# Patient Record
Sex: MALE | NOT HISPANIC OR LATINO | Employment: FULL TIME | URBAN - METROPOLITAN AREA
[De-identification: names, ages, dates, MRNs, and addresses within clinical notes are randomized per-mention and may not be internally consistent; named-entity substitution may affect disease eponyms.]

---

## 2017-02-06 ENCOUNTER — HOSPITAL ENCOUNTER (OUTPATIENT)
Facility: MEDICAL CENTER | Age: 47
End: 2017-02-06
Attending: FAMILY MEDICINE
Payer: COMMERCIAL

## 2017-02-06 PROCEDURE — 88305 TISSUE EXAM BY PATHOLOGIST: CPT

## 2017-02-07 LAB — PATHOLOGY CONSULT NOTE: NORMAL

## 2017-02-22 ENCOUNTER — HOSPITAL ENCOUNTER (OUTPATIENT)
Dept: LAB | Facility: MEDICAL CENTER | Age: 47
End: 2017-02-22
Attending: FAMILY MEDICINE
Payer: COMMERCIAL

## 2017-02-22 LAB
25(OH)D3 SERPL-MCNC: 38 NG/ML (ref 30–100)
ALBUMIN SERPL BCP-MCNC: 4.5 G/DL (ref 3.2–4.9)
ALBUMIN/GLOB SERPL: 1.6 G/DL
ALP SERPL-CCNC: 90 U/L (ref 30–99)
ALT SERPL-CCNC: 22 U/L (ref 2–50)
ANION GAP SERPL CALC-SCNC: 8 MMOL/L (ref 0–11.9)
AST SERPL-CCNC: 31 U/L (ref 12–45)
BASOPHILS # BLD AUTO: 0.06 K/UL (ref 0–0.12)
BASOPHILS NFR BLD AUTO: 1.2 % (ref 0–1.8)
BILIRUB SERPL-MCNC: 1.1 MG/DL (ref 0.1–1.5)
BUN SERPL-MCNC: 20 MG/DL (ref 8–22)
CALCIUM SERPL-MCNC: 10.1 MG/DL (ref 8.5–10.5)
CHLORIDE SERPL-SCNC: 103 MMOL/L (ref 96–112)
CHOLEST SERPL-MCNC: 159 MG/DL (ref 100–199)
CO2 SERPL-SCNC: 29 MMOL/L (ref 20–33)
COMMENT 1642: NORMAL
CREAT SERPL-MCNC: 1.11 MG/DL (ref 0.5–1.4)
EOSINOPHIL # BLD: 0.04 K/UL (ref 0–0.51)
EOSINOPHIL NFR BLD AUTO: 0.8 % (ref 0–6.9)
ERYTHROCYTE [DISTWIDTH] IN BLOOD BY AUTOMATED COUNT: 41.1 FL (ref 35.9–50)
GLOBULIN SER CALC-MCNC: 2.9 G/DL (ref 1.9–3.5)
GLUCOSE SERPL-MCNC: 87 MG/DL (ref 65–99)
HCT VFR BLD AUTO: 44 % (ref 42–52)
HDLC SERPL-MCNC: 70 MG/DL
HGB BLD-MCNC: 15.2 G/DL (ref 14–18)
IMM GRANULOCYTES # BLD AUTO: 0.01 K/UL (ref 0–0.11)
IMM GRANULOCYTES NFR BLD AUTO: 0.2 % (ref 0–0.9)
LDLC SERPL CALC-MCNC: 81 MG/DL
LYMPHOCYTES # BLD: 1.75 K/UL (ref 1–4.8)
LYMPHOCYTES NFR BLD AUTO: 36.2 % (ref 22–41)
MCH RBC QN AUTO: 30.6 PG (ref 27–33)
MCHC RBC AUTO-ENTMCNC: 34.5 G/DL (ref 33.7–35.3)
MCV RBC AUTO: 88.7 FL (ref 81.4–97.8)
MONOCYTES # BLD: 0.32 K/UL (ref 0–0.85)
MONOCYTES NFR BLD AUTO: 6.6 % (ref 0–13.4)
MORPHOLOGY BLD-IMP: NORMAL
NEUTROPHILS # BLD: 2.65 K/UL (ref 1.82–7.42)
NEUTROPHILS NFR BLD AUTO: 55 % (ref 44–72)
NRBC # BLD AUTO: 0 K/UL
NRBC BLD-RTO: 0 /100 WBC
PLATELET # BLD AUTO: 245 K/UL (ref 164–446)
PMV BLD AUTO: 10.9 FL (ref 9–12.9)
POTASSIUM SERPL-SCNC: 5.2 MMOL/L (ref 3.6–5.5)
PROT SERPL-MCNC: 7.4 G/DL (ref 6–8.2)
RBC # BLD AUTO: 4.96 M/UL (ref 4.7–6.1)
SODIUM SERPL-SCNC: 140 MMOL/L (ref 135–145)
TRIGL SERPL-MCNC: 38 MG/DL (ref 0–149)
WBC # BLD AUTO: 4.8 K/UL (ref 4.8–10.8)

## 2017-02-22 PROCEDURE — 80061 LIPID PANEL: CPT

## 2017-02-22 PROCEDURE — 85025 COMPLETE CBC W/AUTO DIFF WBC: CPT

## 2017-02-22 PROCEDURE — 82306 VITAMIN D 25 HYDROXY: CPT

## 2017-02-22 PROCEDURE — 80053 COMPREHEN METABOLIC PANEL: CPT

## 2017-02-22 PROCEDURE — 36415 COLL VENOUS BLD VENIPUNCTURE: CPT

## 2017-10-23 DIAGNOSIS — Z20.2 POSSIBLE EXPOSURE TO STD: ICD-10-CM

## 2017-10-24 ENCOUNTER — HOSPITAL ENCOUNTER (OUTPATIENT)
Dept: LAB | Facility: MEDICAL CENTER | Age: 47
End: 2017-10-24
Attending: FAMILY MEDICINE
Payer: COMMERCIAL

## 2017-10-24 DIAGNOSIS — Z20.2 POSSIBLE EXPOSURE TO STD: ICD-10-CM

## 2017-10-24 LAB
HCV AB SER QL: NEGATIVE
HIV 1+2 AB+HIV1 P24 AG SERPL QL IA: NON REACTIVE
TREPONEMA PALLIDUM IGG+IGM AB [PRESENCE] IN SERUM OR PLASMA BY IMMUNOASSAY: NON REACTIVE

## 2017-10-24 PROCEDURE — 87389 HIV-1 AG W/HIV-1&-2 AB AG IA: CPT

## 2017-10-24 PROCEDURE — 87591 N.GONORRHOEAE DNA AMP PROB: CPT

## 2017-10-24 PROCEDURE — 86803 HEPATITIS C AB TEST: CPT

## 2017-10-24 PROCEDURE — 86780 TREPONEMA PALLIDUM: CPT

## 2017-10-24 PROCEDURE — 86695 HERPES SIMPLEX TYPE 1 TEST: CPT

## 2017-10-24 PROCEDURE — 36415 COLL VENOUS BLD VENIPUNCTURE: CPT

## 2017-10-24 PROCEDURE — 86696 HERPES SIMPLEX TYPE 2 TEST: CPT

## 2017-10-24 PROCEDURE — 87491 CHLMYD TRACH DNA AMP PROBE: CPT

## 2017-10-25 LAB
C TRACH DNA SPEC QL NAA+PROBE: NEGATIVE
N GONORRHOEA DNA SPEC QL NAA+PROBE: NEGATIVE
SPECIMEN SOURCE: NORMAL

## 2017-10-26 LAB
HSV1 GG IGG SER-ACNC: 0.03 IV
HSV2 GG IGG SER-ACNC: 0.05 IV

## 2018-11-13 ENCOUNTER — APPOINTMENT (OUTPATIENT)
Dept: URGENT CARE | Facility: PHYSICIAN GROUP | Age: 48
End: 2018-11-13
Payer: COMMERCIAL

## 2018-11-13 ENCOUNTER — APPOINTMENT (OUTPATIENT)
Dept: RADIOLOGY | Facility: IMAGING CENTER | Age: 48
End: 2018-11-13
Attending: CHIROPRACTOR
Payer: COMMERCIAL

## 2018-11-13 DIAGNOSIS — M25.572 PAIN OF JOINT OF LEFT ANKLE AND FOOT: ICD-10-CM

## 2018-11-13 DIAGNOSIS — M25.571 PAIN IN JOINT INVOLVING RIGHT ANKLE AND FOOT: ICD-10-CM

## 2018-11-13 PROCEDURE — 73610 X-RAY EXAM OF ANKLE: CPT | Mod: TC,FY,RT | Performed by: FAMILY MEDICINE

## 2019-03-12 ENCOUNTER — HOSPITAL ENCOUNTER (OUTPATIENT)
Dept: LAB | Facility: MEDICAL CENTER | Age: 49
End: 2019-03-12
Attending: FAMILY MEDICINE
Payer: COMMERCIAL

## 2019-03-12 LAB
ALBUMIN SERPL BCP-MCNC: 4.3 G/DL (ref 3.2–4.9)
ALBUMIN/GLOB SERPL: 1.4 G/DL
ALP SERPL-CCNC: 96 U/L (ref 30–99)
ALT SERPL-CCNC: 21 U/L (ref 2–50)
ANION GAP SERPL CALC-SCNC: 9 MMOL/L (ref 0–11.9)
AST SERPL-CCNC: 30 U/L (ref 12–45)
BASOPHILS # BLD AUTO: 1.4 % (ref 0–1.8)
BASOPHILS # BLD: 0.08 K/UL (ref 0–0.12)
BILIRUB SERPL-MCNC: 0.4 MG/DL (ref 0.1–1.5)
BUN SERPL-MCNC: 29 MG/DL (ref 8–22)
CALCIUM SERPL-MCNC: 9 MG/DL (ref 8.5–10.5)
CHLORIDE SERPL-SCNC: 107 MMOL/L (ref 96–112)
CHOLEST SERPL-MCNC: 143 MG/DL (ref 100–199)
CO2 SERPL-SCNC: 25 MMOL/L (ref 20–33)
CREAT SERPL-MCNC: 1.06 MG/DL (ref 0.5–1.4)
EOSINOPHIL # BLD AUTO: 0.11 K/UL (ref 0–0.51)
EOSINOPHIL NFR BLD: 2 % (ref 0–6.9)
ERYTHROCYTE [DISTWIDTH] IN BLOOD BY AUTOMATED COUNT: 41.9 FL (ref 35.9–50)
FASTING STATUS PATIENT QL REPORTED: NORMAL
GLOBULIN SER CALC-MCNC: 3 G/DL (ref 1.9–3.5)
GLUCOSE SERPL-MCNC: 83 MG/DL (ref 65–99)
HCT VFR BLD AUTO: 45.9 % (ref 42–52)
HDLC SERPL-MCNC: 59 MG/DL
HGB BLD-MCNC: 15.2 G/DL (ref 14–18)
IMM GRANULOCYTES # BLD AUTO: 0.02 K/UL (ref 0–0.11)
IMM GRANULOCYTES NFR BLD AUTO: 0.4 % (ref 0–0.9)
IRON SERPL-MCNC: 77 UG/DL (ref 50–180)
LDLC SERPL CALC-MCNC: 74 MG/DL
LYMPHOCYTES # BLD AUTO: 1.54 K/UL (ref 1–4.8)
LYMPHOCYTES NFR BLD: 27.3 % (ref 22–41)
MCH RBC QN AUTO: 29.9 PG (ref 27–33)
MCHC RBC AUTO-ENTMCNC: 33.1 G/DL (ref 33.7–35.3)
MCV RBC AUTO: 90.2 FL (ref 81.4–97.8)
MONOCYTES # BLD AUTO: 0.48 K/UL (ref 0–0.85)
MONOCYTES NFR BLD AUTO: 8.5 % (ref 0–13.4)
NEUTROPHILS # BLD AUTO: 3.41 K/UL (ref 1.82–7.42)
NEUTROPHILS NFR BLD: 60.4 % (ref 44–72)
NRBC # BLD AUTO: 0 K/UL
NRBC BLD-RTO: 0 /100 WBC
PLATELET # BLD AUTO: 259 K/UL (ref 164–446)
PMV BLD AUTO: 10.2 FL (ref 9–12.9)
POTASSIUM SERPL-SCNC: 4.3 MMOL/L (ref 3.6–5.5)
PROT SERPL-MCNC: 7.3 G/DL (ref 6–8.2)
RBC # BLD AUTO: 5.09 M/UL (ref 4.7–6.1)
SODIUM SERPL-SCNC: 141 MMOL/L (ref 135–145)
TRIGL SERPL-MCNC: 49 MG/DL (ref 0–149)
WBC # BLD AUTO: 5.6 K/UL (ref 4.8–10.8)

## 2019-03-12 PROCEDURE — 85025 COMPLETE CBC W/AUTO DIFF WBC: CPT

## 2019-03-12 PROCEDURE — 80053 COMPREHEN METABOLIC PANEL: CPT

## 2019-03-12 PROCEDURE — 80061 LIPID PANEL: CPT

## 2019-03-12 PROCEDURE — 83540 ASSAY OF IRON: CPT

## 2019-03-12 PROCEDURE — 36415 COLL VENOUS BLD VENIPUNCTURE: CPT

## 2019-11-11 ENCOUNTER — OFFICE VISIT (OUTPATIENT)
Dept: URGENT CARE | Facility: PHYSICIAN GROUP | Age: 49
End: 2019-11-11
Payer: COMMERCIAL

## 2019-11-11 VITALS
WEIGHT: 193 LBS | HEART RATE: 78 BPM | RESPIRATION RATE: 16 BRPM | TEMPERATURE: 98 F | BODY MASS INDEX: 23.5 KG/M2 | DIASTOLIC BLOOD PRESSURE: 60 MMHG | HEIGHT: 76 IN | SYSTOLIC BLOOD PRESSURE: 110 MMHG | OXYGEN SATURATION: 98 %

## 2019-11-11 DIAGNOSIS — R68.89 FLU-LIKE SYMPTOMS: ICD-10-CM

## 2019-11-11 PROCEDURE — 99203 OFFICE O/P NEW LOW 30 MIN: CPT | Performed by: PHYSICIAN ASSISTANT

## 2019-11-11 SDOH — HEALTH STABILITY: MENTAL HEALTH: HOW OFTEN DO YOU HAVE A DRINK CONTAINING ALCOHOL?: 2-3 TIMES A WEEK

## 2019-11-11 NOTE — PROGRESS NOTES
Chief Complaint   Patient presents with   • Fever     x 5 days, cough, headache, body aches       HISTORY OF PRESENT ILLNESS: Patient is a 48 y.o. male who presents today for the following:    Patient comes in for evaluation of a 5-day history of acute onset cough, fever, body aches.  Patient reports mild nasal congestion and extreme fatigue.  He was feeling a little bit better over the last couple days but woke up this morning feeling more fatigued and achy.  He denies any shortness of breath.  He has not measured his fever.  He has been taking ibuprofen which helps temporarily.    There are no active problems to display for this patient.      Allergies:Patient has no known allergies.    No current [facility]-ordered outpatient medications on file.     No current [facility]-ordered facility-administered medications on file.        History reviewed. No pertinent past medical history.    Social History     Tobacco Use   • Smoking status: Never Smoker   • Smokeless tobacco: Never Used   Substance Use Topics   • Alcohol use: Yes     Frequency: 2-3 times a week   • Drug use: Never       No family status information on file.   History reviewed. No pertinent family history.    Review of Systems:   Constitutional ROS: Positive for fatigue.  Eye ROS: No recent significant change in vision, No eye pain, redness, discharge  Ear ROS: No drainage, No tinnitus or vertigo, No recent change in hearing  Mouth/Throat ROS: No teeth or gum problems, No bleeding gums, No tongue complaints  Neck ROS: No swollen glands, No significant pain in neck  Pulmonary ROS: No chronic cough, sputum, or hemoptysis, No dyspnea on exertion, No wheezing  Cardiovascular ROS: No diaphoresis, No edema, No palpitations  Gastrointestinal ROS: No change in bowel habits, No significant change in appetite, No nausea, vomiting, diarrhea, or constipation  Musculoskeletal/Extremities ROS: No peripheral edema, No pain, redness or swelling on the joints  Hematologic/Lymphatic  "ROS: Positive for fever and body aches.  Skin/Integumentary ROS: No edema, No evidence of rash, No itching      Exam:  /60   Pulse 78   Temp 36.7 °C (98 °F) (Temporal)   Resp 16   Ht 1.93 m (6' 4\")   Wt 87.5 kg (193 lb)   SpO2 98%   General: Well developed, well nourished. No distress.  Eye: PERRL/EOMI; conjunctivae clear, lids normal.  ENMT: Lips without lesions, MMM. Oropharynx is clear. Bilateral TMs are within normal limits.  Pulmonary: Unlabored respiratory effort. Lungs clear to auscultation, no wheezes, no rhonchi.  Cardiovascular: Regular rate and rhythm without murmur.   Neurologic: Grossly nonfocal. No facial asymmetry noted.  Lymph: No cervical lymphadenopathy noted.  Skin: Warm, dry, good turgor. No rashes in visible areas.   Psych: Normal mood. Alert and oriented x3. Judgment and insight is normal.    Assessment/Plan:  Discussed with patient that his symptoms are consistent with influenza.  Given that he is 5 days out from symptom onset, Tamiflu is likely of little benefit therefore he was not tested. Low suspicion for pneumonia.. Discussed appropriate over-the-counter symptomatic medication, and when to return to clinic. Follow up for worsening or persistent symptoms.  1. Flu-like symptoms         "

## 2019-11-19 DIAGNOSIS — J06.9 UPPER RESPIRATORY TRACT INFECTION, UNSPECIFIED TYPE: ICD-10-CM

## 2019-11-19 RX ORDER — AZITHROMYCIN 250 MG/1
TABLET, FILM COATED ORAL
Qty: 6 TAB | Refills: 0 | Status: SHIPPED | OUTPATIENT
Start: 2019-11-19 | End: 2022-01-07

## 2019-11-19 NOTE — PROGRESS NOTES
Seen 11/11/19, flu like symptoms. Still very sick per email. Will try abx. Follow up if symptoms do not improve.      Email:  I’m still not well, I’ve been battling a fever on and off for over a week. I’m weak (absolutely no energy), congested and coughing a significant amount along with spitting up phlegm (yellow). Is there any way you can call in a prescription for antibiotics?  Thank you again for leaving your email, I appreciate it so much. I thought by now I’d be better.    Thank you,  Daniel Chang

## 2021-01-28 ENCOUNTER — HOSPITAL ENCOUNTER (OUTPATIENT)
Dept: LAB | Facility: MEDICAL CENTER | Age: 51
End: 2021-01-28
Attending: FAMILY MEDICINE
Payer: COMMERCIAL

## 2021-01-28 LAB
25(OH)D3 SERPL-MCNC: 34 NG/ML (ref 30–100)
ALBUMIN SERPL BCP-MCNC: 4.6 G/DL (ref 3.2–4.9)
ALBUMIN/GLOB SERPL: 1.7 G/DL
ALP SERPL-CCNC: 80 U/L (ref 30–99)
ALT SERPL-CCNC: 21 U/L (ref 2–50)
ANION GAP SERPL CALC-SCNC: 8 MMOL/L (ref 7–16)
AST SERPL-CCNC: 34 U/L (ref 12–45)
BASOPHILS # BLD AUTO: 1 % (ref 0–1.8)
BASOPHILS # BLD: 0.05 K/UL (ref 0–0.12)
BILIRUB SERPL-MCNC: 0.8 MG/DL (ref 0.1–1.5)
BUN SERPL-MCNC: 19 MG/DL (ref 8–22)
CALCIUM SERPL-MCNC: 9.6 MG/DL (ref 8.5–10.5)
CHLORIDE SERPL-SCNC: 104 MMOL/L (ref 96–112)
CHOLEST SERPL-MCNC: 187 MG/DL (ref 100–199)
CO2 SERPL-SCNC: 26 MMOL/L (ref 20–33)
CREAT SERPL-MCNC: 0.97 MG/DL (ref 0.5–1.4)
EOSINOPHIL # BLD AUTO: 0.05 K/UL (ref 0–0.51)
EOSINOPHIL NFR BLD: 1 % (ref 0–6.9)
ERYTHROCYTE [DISTWIDTH] IN BLOOD BY AUTOMATED COUNT: 42.7 FL (ref 35.9–50)
FASTING STATUS PATIENT QL REPORTED: NORMAL
GLOBULIN SER CALC-MCNC: 2.7 G/DL (ref 1.9–3.5)
GLUCOSE SERPL-MCNC: 89 MG/DL (ref 65–99)
HCT VFR BLD AUTO: 45 % (ref 42–52)
HDLC SERPL-MCNC: 84 MG/DL
HGB BLD-MCNC: 15.3 G/DL (ref 14–18)
IMM GRANULOCYTES # BLD AUTO: 0.01 K/UL (ref 0–0.11)
IMM GRANULOCYTES NFR BLD AUTO: 0.2 % (ref 0–0.9)
LDLC SERPL CALC-MCNC: 95 MG/DL
LYMPHOCYTES # BLD AUTO: 1.49 K/UL (ref 1–4.8)
LYMPHOCYTES NFR BLD: 29.3 % (ref 22–41)
MCH RBC QN AUTO: 30.6 PG (ref 27–33)
MCHC RBC AUTO-ENTMCNC: 34 G/DL (ref 33.7–35.3)
MCV RBC AUTO: 90 FL (ref 81.4–97.8)
MONOCYTES # BLD AUTO: 0.33 K/UL (ref 0–0.85)
MONOCYTES NFR BLD AUTO: 6.5 % (ref 0–13.4)
NEUTROPHILS # BLD AUTO: 3.15 K/UL (ref 1.82–7.42)
NEUTROPHILS NFR BLD: 62 % (ref 44–72)
NRBC # BLD AUTO: 0 K/UL
NRBC BLD-RTO: 0 /100 WBC
PLATELET # BLD AUTO: 214 K/UL (ref 164–446)
PMV BLD AUTO: 10.7 FL (ref 9–12.9)
POTASSIUM SERPL-SCNC: 4.4 MMOL/L (ref 3.6–5.5)
PROT SERPL-MCNC: 7.3 G/DL (ref 6–8.2)
PSA SERPL-MCNC: 1.13 NG/ML (ref 0–4)
RBC # BLD AUTO: 5 M/UL (ref 4.7–6.1)
SODIUM SERPL-SCNC: 138 MMOL/L (ref 135–145)
TRIGL SERPL-MCNC: 41 MG/DL (ref 0–149)
WBC # BLD AUTO: 5.1 K/UL (ref 4.8–10.8)

## 2021-01-28 PROCEDURE — 84153 ASSAY OF PSA TOTAL: CPT

## 2021-01-28 PROCEDURE — 80053 COMPREHEN METABOLIC PANEL: CPT

## 2021-01-28 PROCEDURE — 80061 LIPID PANEL: CPT

## 2021-01-28 PROCEDURE — 85025 COMPLETE CBC W/AUTO DIFF WBC: CPT

## 2021-01-28 PROCEDURE — 36415 COLL VENOUS BLD VENIPUNCTURE: CPT

## 2021-01-28 PROCEDURE — 82306 VITAMIN D 25 HYDROXY: CPT

## 2021-08-10 ENCOUNTER — APPOINTMENT (RX ONLY)
Dept: URBAN - NONMETROPOLITAN AREA CLINIC 15 | Facility: CLINIC | Age: 51
Setting detail: DERMATOLOGY
End: 2021-08-10

## 2021-08-10 DIAGNOSIS — Z71.89 OTHER SPECIFIED COUNSELING: ICD-10-CM

## 2021-08-10 DIAGNOSIS — D22 MELANOCYTIC NEVI: ICD-10-CM

## 2021-08-10 DIAGNOSIS — L82.1 OTHER SEBORRHEIC KERATOSIS: ICD-10-CM

## 2021-08-10 DIAGNOSIS — D18.0 HEMANGIOMA: ICD-10-CM

## 2021-08-10 DIAGNOSIS — L57.0 ACTINIC KERATOSIS: ICD-10-CM

## 2021-08-10 DIAGNOSIS — Z87.2 PERSONAL HISTORY OF DISEASES OF THE SKIN AND SUBCUTANEOUS TISSUE: ICD-10-CM

## 2021-08-10 DIAGNOSIS — L81.4 OTHER MELANIN HYPERPIGMENTATION: ICD-10-CM

## 2021-08-10 PROBLEM — D22.61 MELANOCYTIC NEVI OF RIGHT UPPER LIMB, INCLUDING SHOULDER: Status: ACTIVE | Noted: 2021-08-10

## 2021-08-10 PROBLEM — D18.01 HEMANGIOMA OF SKIN AND SUBCUTANEOUS TISSUE: Status: ACTIVE | Noted: 2021-08-10

## 2021-08-10 PROBLEM — D22.72 MELANOCYTIC NEVI OF LEFT LOWER LIMB, INCLUDING HIP: Status: ACTIVE | Noted: 2021-08-10

## 2021-08-10 PROBLEM — D22.71 MELANOCYTIC NEVI OF RIGHT LOWER LIMB, INCLUDING HIP: Status: ACTIVE | Noted: 2021-08-10

## 2021-08-10 PROBLEM — D22.62 MELANOCYTIC NEVI OF LEFT UPPER LIMB, INCLUDING SHOULDER: Status: ACTIVE | Noted: 2021-08-10

## 2021-08-10 PROBLEM — D22.5 MELANOCYTIC NEVI OF TRUNK: Status: ACTIVE | Noted: 2021-08-10

## 2021-08-10 PROCEDURE — ? LIQUID NITROGEN

## 2021-08-10 PROCEDURE — 99203 OFFICE O/P NEW LOW 30 MIN: CPT | Mod: 25

## 2021-08-10 PROCEDURE — 17003 DESTRUCT PREMALG LES 2-14: CPT

## 2021-08-10 PROCEDURE — ? OBSERVATION

## 2021-08-10 PROCEDURE — 17000 DESTRUCT PREMALG LESION: CPT

## 2021-08-10 PROCEDURE — ? COUNSELING

## 2021-08-10 ASSESSMENT — LOCATION DETAILED DESCRIPTION DERM
LOCATION DETAILED: LEFT MEDIAL SUPERIOR CHEST
LOCATION DETAILED: LEFT LATERAL EYEBROW
LOCATION DETAILED: LEFT VENTRAL PROXIMAL FOREARM
LOCATION DETAILED: RIGHT ANTERIOR DISTAL THIGH
LOCATION DETAILED: LEFT SUPERIOR OCCIPITAL SCALP
LOCATION DETAILED: LEFT POSTERIOR SHOULDER
LOCATION DETAILED: RIGHT SUPERIOR OCCIPITAL SCALP
LOCATION DETAILED: RIGHT MEDIAL INFERIOR CHEST
LOCATION DETAILED: RIGHT DISTAL POSTERIOR UPPER ARM
LOCATION DETAILED: RIGHT PROXIMAL CALF
LOCATION DETAILED: RIGHT POSTERIOR SHOULDER
LOCATION DETAILED: RIGHT LATERAL SUPERIOR CHEST
LOCATION DETAILED: LEFT PROXIMAL POSTERIOR UPPER ARM
LOCATION DETAILED: LEFT DISTAL POSTERIOR THIGH
LOCATION DETAILED: RIGHT PROXIMAL DORSAL FOREARM
LOCATION DETAILED: LEFT SUPERIOR LATERAL UPPER BACK
LOCATION DETAILED: RIGHT VENTRAL PROXIMAL FOREARM
LOCATION DETAILED: LEFT PROXIMAL CALF
LOCATION DETAILED: RIGHT SUPERIOR PARIETAL SCALP
LOCATION DETAILED: LEFT ANTERIOR DISTAL THIGH
LOCATION DETAILED: LEFT LATERAL SUPERIOR CHEST
LOCATION DETAILED: RIGHT DISTAL POSTERIOR THIGH
LOCATION DETAILED: LEFT SUPERIOR UPPER BACK
LOCATION DETAILED: LEFT PROXIMAL DORSAL FOREARM
LOCATION DETAILED: RIGHT INFERIOR LATERAL MALAR CHEEK
LOCATION DETAILED: LEFT SUPERIOR PARIETAL SCALP
LOCATION DETAILED: RIGHT INFERIOR MEDIAL UPPER BACK

## 2021-08-10 ASSESSMENT — LOCATION SIMPLE DESCRIPTION DERM
LOCATION SIMPLE: RIGHT CHEEK
LOCATION SIMPLE: LEFT CALF
LOCATION SIMPLE: RIGHT UPPER BACK
LOCATION SIMPLE: RIGHT THIGH
LOCATION SIMPLE: LEFT SHOULDER
LOCATION SIMPLE: SCALP
LOCATION SIMPLE: LEFT UPPER ARM
LOCATION SIMPLE: RIGHT SHOULDER
LOCATION SIMPLE: LEFT UPPER BACK
LOCATION SIMPLE: LEFT OCCIPITAL SCALP
LOCATION SIMPLE: LEFT POSTERIOR THIGH
LOCATION SIMPLE: RIGHT FOREARM
LOCATION SIMPLE: LEFT EYEBROW
LOCATION SIMPLE: RIGHT CALF
LOCATION SIMPLE: LEFT THIGH
LOCATION SIMPLE: RIGHT POSTERIOR THIGH
LOCATION SIMPLE: CHEST
LOCATION SIMPLE: RIGHT UPPER ARM
LOCATION SIMPLE: LEFT FOREARM
LOCATION SIMPLE: RIGHT OCCIPITAL SCALP

## 2021-08-10 ASSESSMENT — LOCATION ZONE DERM
LOCATION ZONE: SCALP
LOCATION ZONE: TRUNK
LOCATION ZONE: FACE
LOCATION ZONE: LEG
LOCATION ZONE: ARM

## 2021-08-10 NOTE — PROCEDURE: LIQUID NITROGEN
Post-Care Instructions: I reviewed with the patient in detail post-care instructions. Patient is to wear sunprotection, and avoid picking at any of the treated lesions. Pt may apply Vaseline to crusted or scabbing areas.
Render Post-Care Instructions In Note?: no
Consent: The patient's consent was obtained including but not limited to risks of crusting, scabbing, blistering, scarring, darker or lighter pigmentary change, recurrence, incomplete removal and infection.
Show Aperture Variable?: Yes
Duration Of Freeze Thaw-Cycle (Seconds): 0
Detail Level: Detailed

## 2022-01-07 ENCOUNTER — OFFICE VISIT (OUTPATIENT)
Dept: URGENT CARE | Facility: PHYSICIAN GROUP | Age: 52
End: 2022-01-07
Payer: COMMERCIAL

## 2022-01-07 VITALS
SYSTOLIC BLOOD PRESSURE: 122 MMHG | WEIGHT: 204 LBS | HEIGHT: 75 IN | BODY MASS INDEX: 25.36 KG/M2 | HEART RATE: 63 BPM | OXYGEN SATURATION: 100 % | DIASTOLIC BLOOD PRESSURE: 68 MMHG | TEMPERATURE: 97.1 F | RESPIRATION RATE: 12 BRPM

## 2022-01-07 DIAGNOSIS — A09 TRAVELER'S DIARRHEA: ICD-10-CM

## 2022-01-07 PROCEDURE — 99213 OFFICE O/P EST LOW 20 MIN: CPT | Performed by: STUDENT IN AN ORGANIZED HEALTH CARE EDUCATION/TRAINING PROGRAM

## 2022-01-07 RX ORDER — AZITHROMYCIN 500 MG/1
1000 TABLET, FILM COATED ORAL ONCE
Qty: 2 TABLET | Refills: 0 | Status: SHIPPED | OUTPATIENT
Start: 2022-01-07 | End: 2022-01-07

## 2022-01-07 ASSESSMENT — FIBROSIS 4 INDEX: FIB4 SCORE: 1.84

## 2022-01-07 NOTE — PROGRESS NOTES
"Subjective:   CHIEF COMPLAINT  Chief Complaint   Patient presents with   • Diarrhea     was in [place], symptoms x8 days       HPI  Daniel Chang is a 51 y.o. male who presents with a chief complaint of crampy abdominal discomfort associated with diarrhea which developed on 12/29/2021.  Patient went to [facility] on 12/25 and symptoms developed approximately 4 days later.  Since that time the patient reports has had multiple loose stools throughout the day.  He has not had any formed bowel movements.  There has been no blood.  No overt abdominal pain but more of a crampy discomfort.  He has not been experiencing any fevers, chills, body aches, nausea or vomiting.  Patient has had a colonoscopy in the past which was unremarkable.    REVIEW OF SYSTEMS  General: no fever or chills  GI: no nausea or vomiting  See HPI for further details.    PAST MEDICAL HISTORY  There are no problems to display for this patient.      SURGICAL HISTORY  patient denies any surgical history    ALLERGIES  No Known Allergies    CURRENT MEDICATIONS  Home Medications     Reviewed by Jude Herrera D.O. (Physician) on 01/07/22 at 1235  Med List Status: <None>   Medication Last Dose Status   azithromycin (ZITHROMAX) 250 MG Tab  Active                SOCIAL HISTORY  Social History     Tobacco Use   • Smoking status: Never Smoker   • Smokeless tobacco: Never Used   Substance and Sexual Activity   • Alcohol use: Yes   • Drug use: Never   • Sexual activity: Not on file       FAMILY HISTORY  No family history on file.       Objective:   PHYSICAL EXAM  VITAL SIGNS: /68   Pulse 63   Temp 36.2 °C (97.1 °F)   Resp 12   Ht 1.905 m (6' 3\")   Wt 92.5 kg (204 lb)   SpO2 100%   BMI 25.50 kg/m²     Gen: no acute distress, normal voice  Skin: dry, intact, moist mucosal membranes  Lungs: CTAB w/ symmetric expansion  CV: RRR w/o murmurs or clicks  Abdomen: Bowel sounds normal, soft, no tenderness, rebound or guarding.   Psych: normal affect, " normal judgement, alert, awake    Assessment/Plan:     1. Traveler's diarrhea  azithromycin (ZITHROMAX) 500 MG tablet   No red flags.  Exam is reassuring.  Sent Rx for azithromycin.  Follow-up in urgent care as needed if symptoms worsen or do not improve.    Differential diagnosis, natural history, supportive care, and indications for immediate follow-up discussed. All questions answered. Patient agrees with the plan of care.    Follow-up as needed if symptoms worsen or fail to improve to PCP, Urgent care or Emergency Room.    Please note that this dictation was created using voice recognition software. I have made a reasonable attempt to correct obvious errors, but I expect that there are errors of grammar and possibly content that I did not discover before finalizing the note.

## 2023-11-07 ENCOUNTER — OFFICE VISIT (OUTPATIENT)
Dept: URGENT CARE | Facility: PHYSICIAN GROUP | Age: 53
End: 2023-11-07
Payer: COMMERCIAL

## 2023-11-07 VITALS
HEIGHT: 77 IN | RESPIRATION RATE: 16 BRPM | TEMPERATURE: 98.1 F | WEIGHT: 197.6 LBS | SYSTOLIC BLOOD PRESSURE: 100 MMHG | BODY MASS INDEX: 23.33 KG/M2 | DIASTOLIC BLOOD PRESSURE: 72 MMHG | OXYGEN SATURATION: 99 % | HEART RATE: 55 BPM

## 2023-11-07 DIAGNOSIS — R21 RASH: Primary | ICD-10-CM

## 2023-11-07 PROCEDURE — 3074F SYST BP LT 130 MM HG: CPT | Performed by: PHYSICIAN ASSISTANT

## 2023-11-07 PROCEDURE — 3078F DIAST BP <80 MM HG: CPT | Performed by: PHYSICIAN ASSISTANT

## 2023-11-07 PROCEDURE — 99213 OFFICE O/P EST LOW 20 MIN: CPT | Performed by: PHYSICIAN ASSISTANT

## 2023-11-07 RX ORDER — PERMETHRIN 50 MG/G
CREAM TOPICAL
Qty: 60 G | Refills: 0 | Status: SHIPPED | OUTPATIENT
Start: 2023-11-07

## 2023-11-07 RX ORDER — TRIAMCINOLONE ACETONIDE 1 MG/G
CREAM TOPICAL
Qty: 45 G | Refills: 0 | Status: SHIPPED | OUTPATIENT
Start: 2023-11-07

## 2023-11-07 NOTE — PROGRESS NOTES
"Subjective:   Daniel Chang is a 52 y.o. male who presents for Rash (Bilateral leg with rash from short line to ankle. X 1 week and very itchy )      HPI  The patient presents to the Urgent Care with complaints of a rash onset about a week ago.  Was recently traveling.  He was running in the forest in [place]. Noticed rash the next day after staying in a hotel. Rash started to his ankles.  Rash is worse on the right leg but he has the rash on the left leg as well.  Somewhat itchy but not severe itching.  Some of the areas of the rash appeared a couple days later from initial onset.  Some areas on his lower legs, ankle, and thighs.  No rash to hands or wrists.  Some oozing clear drainage occasionally.  No fevers or chills.        No past medical history on file.  No Known Allergies     Objective:     /72   Pulse (!) 55   Temp 36.7 °C (98.1 °F) (Temporal)   Resp 16   Ht 1.956 m (6' 5\")   Wt 89.6 kg (197 lb 9.6 oz)   SpO2 99%   BMI 23.43 kg/m²     Physical Exam  Vitals reviewed.   Constitutional:       General: He is not in acute distress.     Appearance: Normal appearance. He is not ill-appearing or toxic-appearing.   Eyes:      Conjunctiva/sclera: Conjunctivae normal.   Cardiovascular:      Rate and Rhythm: Normal rate.   Pulmonary:      Effort: Pulmonary effort is normal.   Musculoskeletal:      Cervical back: Neck supple.   Skin:     General: Skin is warm and dry.      Findings: Rash present.      Comments: Few scattered erythematous papules and vesicles with trace erythema to bilateral ankles, lower legs, and thighs. Some lesions are in a linear pattern not larger than 3 cm long. No induration. No proximal streaking. No abscess.    Neurological:      General: No focal deficit present.      Mental Status: He is alert and oriented to person, place, and time.   Psychiatric:         Mood and Affect: Mood normal.         Behavior: Behavior normal.         Diagnosis and associated orders:     1. Rash  - " permethrin (ELIMITE) 5 % Cream; Apply half the tube to all areas of the body from the neck to soles of feet; leave on for 8 to 14 hours before removing by washing in shower. May repeat after 2 weeks if still have symptoms.  Dispense: 60 g; Refill: 0  - triamcinolone acetonide (KENALOG) 0.1 % Cream; Apply to affected area(s) twice daily for no longer than 14 days  Dispense: 45 g; Refill: 0       Comments/MDM:     Patient's presenting symptoms and exam findings consistent most likely with either poison ivy allergic contact dermatitis versus scabies.  Lower suspicion for scabies.  Running in forest. Stayed in hotel. However, we will treat with permethrin and triamcinolone cream.  Cool compresses.  Try to avoid itching.  May try Zyrtec.  If no improvement in a week or 2 or any worsening, return to the urgent care for evaluation. Wash clothes and all linens in hot water.          I personally reviewed prior external notes and test results pertinent to today's visit. Pathogenesis of diagnosis discussed including typical length and natural progression. Supportive care, natural history, differential diagnoses, and indications for immediate follow-up discussed. Patient expresses understanding and agrees to plan. Patient denies any other questions or concerns.     Follow-up with the primary care physician for recheck, reevaluation, and consideration of further management.    Please note that this dictation was created using voice recognition software. I have made a reasonable attempt to correct obvious errors, but I expect that there are errors of grammar and possibly content that I did not discover before finalizing the note.    This note was electronically signed by Jericho Campos PA-C

## 2023-11-11 ENCOUNTER — HOSPITAL ENCOUNTER (OUTPATIENT)
Facility: MEDICAL CENTER | Age: 53
End: 2023-11-11
Attending: NURSE PRACTITIONER
Payer: COMMERCIAL

## 2023-11-11 ENCOUNTER — OFFICE VISIT (OUTPATIENT)
Dept: URGENT CARE | Facility: PHYSICIAN GROUP | Age: 53
End: 2023-11-11
Payer: COMMERCIAL

## 2023-11-11 VITALS
HEART RATE: 60 BPM | BODY MASS INDEX: 23.5 KG/M2 | DIASTOLIC BLOOD PRESSURE: 68 MMHG | SYSTOLIC BLOOD PRESSURE: 104 MMHG | WEIGHT: 199 LBS | OXYGEN SATURATION: 99 % | RESPIRATION RATE: 16 BRPM | TEMPERATURE: 97.9 F | HEIGHT: 77 IN

## 2023-11-11 DIAGNOSIS — L25.5 DERMATITIS DUE TO PLANTS, INCLUDING POISON IVY, SUMAC, AND OAK: ICD-10-CM

## 2023-11-11 PROCEDURE — 99213 OFFICE O/P EST LOW 20 MIN: CPT | Performed by: NURSE PRACTITIONER

## 2023-11-11 PROCEDURE — 87205 SMEAR GRAM STAIN: CPT

## 2023-11-11 PROCEDURE — 3074F SYST BP LT 130 MM HG: CPT | Performed by: NURSE PRACTITIONER

## 2023-11-11 PROCEDURE — 3078F DIAST BP <80 MM HG: CPT | Performed by: NURSE PRACTITIONER

## 2023-11-11 PROCEDURE — 87070 CULTURE OTHR SPECIMN AEROBIC: CPT

## 2023-11-11 RX ORDER — PREDNISONE 10 MG/1
TABLET ORAL
Qty: 30 TABLET | Refills: 0 | Status: SHIPPED | OUTPATIENT
Start: 2023-11-11 | End: 2023-11-20

## 2023-11-11 ASSESSMENT — ENCOUNTER SYMPTOMS
CARDIOVASCULAR NEGATIVE: 1
EYES NEGATIVE: 1
GASTROINTESTINAL NEGATIVE: 1
RESPIRATORY NEGATIVE: 1
FEVER: 0
CONSTITUTIONAL NEGATIVE: 1

## 2023-11-11 NOTE — PROGRESS NOTES
"Subjective:   Daniel Chang is a 52 y.o. male who presents for Rash (X over a week on bilateral legs and L arm.  He was seen on 11-07-23 and given steroid cream but it is not helping. )      Patient presents for an evaluation of a worsening rash to his lower extremities, now progressing to his upper extremities.  He was seen on 11/7/2023 and was placed on steroid cream as well as Elimite cream, for a diagnosis of possible poison oak reaction versus scabies.  Patient did run a race which was in the hills and trees the Saturday prior to him being seen on 11/4/2023.  He did get hung up in some bushes, and also had a history of staying the night in a hotel.  Due to the vesicular eruptions, erythema, and some linear patterns, he was treated for possible scabies versus poison oak.  Patient has been using the Elimite cream as well as the steroid cream, but has noted no improvement in his symptoms.  He is actually worsening, with more eruptions to the tops of his thighs and now beginning on his left arm.  He does present with his wife today who is requesting a sample be sent to the lab for further evaluation of what the actual rash is.  Patient denies any fever or chills associated with this.  He does endorse extreme itching, which is keeping him up at night.  He has washed all of his clothing and bedding as instructed from his prior appointment.        Review of Systems   Constitutional: Negative.  Negative for fever.   HENT: Negative.     Eyes: Negative.    Respiratory: Negative.     Cardiovascular: Negative.    Gastrointestinal: Negative.    Skin:  Positive for itching and rash.        Bilateral legs, worsening, now to his left upper arm       Medications, Allergies, and current problem list reviewed today in Epic.     Objective:     /68   Pulse 60   Temp 36.6 °C (97.9 °F) (Temporal)   Resp 16   Ht 1.956 m (6' 5\")   Wt 90.3 kg (199 lb)   SpO2 99%     Physical Exam  Vitals reviewed.   Constitutional:       " General: He is not in acute distress.     Appearance: Normal appearance. He is not ill-appearing.   HENT:      Head: Normocephalic and atraumatic.      Nose: Nose normal.      Mouth/Throat:      Mouth: Mucous membranes are moist.      Pharynx: Oropharynx is clear.   Eyes:      Extraocular Movements: Extraocular movements intact.      Conjunctiva/sclera: Conjunctivae normal.      Pupils: Pupils are equal, round, and reactive to light.   Cardiovascular:      Rate and Rhythm: Normal rate and regular rhythm.      Pulses: Normal pulses.      Heart sounds: Normal heart sounds.   Pulmonary:      Effort: Pulmonary effort is normal.      Breath sounds: Normal breath sounds.   Abdominal:      General: Abdomen is flat. Bowel sounds are normal.      Palpations: Abdomen is soft.   Musculoskeletal:         General: Normal range of motion.      Cervical back: Normal range of motion and neck supple.   Skin:     General: Skin is warm and dry.      Capillary Refill: Capillary refill takes less than 2 seconds.      Findings: Erythema and rash present. Rash is macular, papular, scaling and vesicular.      Comments: Patient has diffuse erythematous, vesicular, macular papular rash to his bilateral lower extremities with some smaller eruptions to the inside of his left arm.  Some of these eruptions are in a linear pattern, but most are in an irregular pattern.  Evidence of excoriation is noted.  No secondary infection noted.  No weeping or drainage.  Sample was taken and sent to lab.   Neurological:      General: No focal deficit present.      Mental Status: He is alert and oriented to person, place, and time.   Psychiatric:         Mood and Affect: Mood normal.         Behavior: Behavior normal.         Assessment/Plan:     Diagnosis and associated orders:     1. Dermatitis due to plants, including poison ivy, sumac, and oak  predniSONE (DELTASONE) 10 MG Tab    CULTURE WOUND W/ GRAM STAIN         Comments/MDM:     Discussed with patient  and his wife that I am not completely certain which type of a skin eruption this is, though it does appear consistent with plant-based eruption such as poison ivy or oak.  I am less suspicious for scabies at this time.  We did send a sample to the lab for further evaluation today.  Patient was placed on a steroid taper, to help improve the eruption.  Patient will also add topical calamine lotion, continue the steroid topical, as well as use Benadryl at nighttime, and Claritin during the day.  Patient will follow-up with his PCP if this does not improve in 3 to 4 days.  He was advised that these types of outbreaks can last a couple of weeks.  Patient will be advised of his lab results when available, and any further treatment adjustments will be made at that time based on those results.         Differential diagnosis, natural history, supportive care, and indications for immediate follow-up discussed.    Advised the patient to follow-up with the primary care physician for recheck, reevaluation, and consideration of further management.    Please note that this dictation was created using voice recognition software. I have made a reasonable attempt to correct obvious errors, but I expect that there are errors of grammar and possibly content that I did not discover before finalizing the note.    This note was electronically signed by MILVIA Verma

## 2023-11-12 LAB
GRAM STN SPEC: NORMAL
SIGNIFICANT IND 70042: NORMAL
SITE SITE: NORMAL
SOURCE SOURCE: NORMAL

## 2023-11-14 LAB
BACTERIA WND AEROBE CULT: NORMAL
GRAM STN SPEC: NORMAL
SIGNIFICANT IND 70042: NORMAL
SITE SITE: NORMAL
SOURCE SOURCE: NORMAL

## 2023-11-15 NOTE — RESULT ENCOUNTER NOTE
Left message for patient that the results of his culture showed no organisms and norm al skin litzy.

## 2024-06-04 ENCOUNTER — OFFICE VISIT (OUTPATIENT)
Dept: URGENT CARE | Facility: PHYSICIAN GROUP | Age: 54
End: 2024-06-04
Payer: COMMERCIAL

## 2024-06-04 VITALS
BODY MASS INDEX: 22.91 KG/M2 | OXYGEN SATURATION: 97 % | SYSTOLIC BLOOD PRESSURE: 108 MMHG | WEIGHT: 194 LBS | RESPIRATION RATE: 18 BRPM | HEIGHT: 77 IN | HEART RATE: 85 BPM | DIASTOLIC BLOOD PRESSURE: 64 MMHG | TEMPERATURE: 97.4 F

## 2024-06-04 DIAGNOSIS — M70.21 OLECRANON BURSITIS, RIGHT ELBOW: ICD-10-CM

## 2024-06-04 PROCEDURE — 99213 OFFICE O/P EST LOW 20 MIN: CPT | Performed by: STUDENT IN AN ORGANIZED HEALTH CARE EDUCATION/TRAINING PROGRAM

## 2024-06-04 PROCEDURE — 3074F SYST BP LT 130 MM HG: CPT | Performed by: STUDENT IN AN ORGANIZED HEALTH CARE EDUCATION/TRAINING PROGRAM

## 2024-06-04 PROCEDURE — 3078F DIAST BP <80 MM HG: CPT | Performed by: STUDENT IN AN ORGANIZED HEALTH CARE EDUCATION/TRAINING PROGRAM

## 2024-06-04 RX ORDER — NAPROXEN 500 MG/1
500 TABLET ORAL 2 TIMES DAILY WITH MEALS
Qty: 14 TABLET | Refills: 0 | Status: SHIPPED | OUTPATIENT
Start: 2024-06-04

## 2024-06-05 ASSESSMENT — ENCOUNTER SYMPTOMS
FEVER: 0
CHILLS: 0

## 2024-06-18 ENCOUNTER — OFFICE VISIT (OUTPATIENT)
Dept: SPORTS MEDICINE | Facility: OTHER | Age: 54
End: 2024-06-18
Attending: STUDENT IN AN ORGANIZED HEALTH CARE EDUCATION/TRAINING PROGRAM
Payer: COMMERCIAL

## 2024-06-18 VITALS
TEMPERATURE: 98 F | HEART RATE: 64 BPM | BODY MASS INDEX: 22.91 KG/M2 | DIASTOLIC BLOOD PRESSURE: 72 MMHG | SYSTOLIC BLOOD PRESSURE: 110 MMHG | OXYGEN SATURATION: 99 % | WEIGHT: 194 LBS | HEIGHT: 77 IN

## 2024-06-18 DIAGNOSIS — M70.21 OLECRANON BURSITIS, RIGHT ELBOW: ICD-10-CM

## 2024-06-18 PROCEDURE — 3074F SYST BP LT 130 MM HG: CPT | Performed by: FAMILY MEDICINE

## 2024-06-18 PROCEDURE — 99214 OFFICE O/P EST MOD 30 MIN: CPT | Performed by: FAMILY MEDICINE

## 2024-06-18 PROCEDURE — 3078F DIAST BP <80 MM HG: CPT | Performed by: FAMILY MEDICINE

## 2024-06-18 ASSESSMENT — ENCOUNTER SYMPTOMS
NAUSEA: 0
CHILLS: 0
FEVER: 0
SHORTNESS OF BREATH: 0
DIZZINESS: 0
VOMITING: 0

## 2024-06-18 NOTE — Clinical Note
Solis Ledezma, Thank you for referring Daniel to the sports medicine clinic for his elbow swelling. Fortunately, the compression and conservative management has HELPED.  His swelling has come down to a manageable level.  He is going to see how he does over the next few weeks and we will schedule if he would like to proceed with aspiration/corticosteroid injection of the bursa. Hope you are well! Respectfully,  RAUL Goodrich M.D. [facility] [place] [phone]

## 2024-06-18 NOTE — PROGRESS NOTES
Chief Complaint   Patient presents with    Elbow Pain     Right elbow bursitis       Subjective     Referred by Brisa Heredia P.A.-C. for evaluation of RIGHT elbow swelling (L handed)  Insidious onset without specific incident that he can recall  Approximately 2 or more months ago (around early April 2024)  Swelling of the RIGHT olecranon bursa  Seen in urgent care and provided with an Ace wrap  Fortunately, swelling has come down some, but some of the swelling still persists  Not really experiencing much pain  Some discomfort if he weightbears on the RIGHT elbow  Not taking any medication for pain  No night symptoms  No prior injuries or issues with the RIGHT elbow      Running, cycling, weightlifting, swelling    Review of Systems   Constitutional:  Negative for chills and fever.   Respiratory:  Negative for shortness of breath.    Cardiovascular:  Negative for chest pain.   Gastrointestinal:  Negative for nausea and vomiting.   Neurological:  Negative for dizziness.     PMH:  has no past medical history on file.  MEDS:   Current Outpatient Medications:     naproxen (NAPROSYN) 500 MG Tab, Take 1 Tablet by mouth 2 times a day with meals. (Patient not taking: Reported on 6/18/2024), Disp: 14 Tablet, Rfl: 0    permethrin (ELIMITE) 5 % Cream, Apply half the tube to all areas of the body from the neck to soles of feet; leave on for 8 to 14 hours before removing by washing in shower. May repeat after 2 weeks if still have symptoms. (Patient not taking: Reported on 6/4/2024), Disp: 60 g, Rfl: 0    triamcinolone acetonide (KENALOG) 0.1 % Cream, Apply to affected area(s) twice daily for no longer than 14 days (Patient not taking: Reported on 6/4/2024), Disp: 45 g, Rfl: 0  ALLERGIES: No Known Allergies  SURGHX: No past surgical history on file.  SOCHX:  reports that he has never smoked. He has never used smokeless tobacco. He reports current alcohol use. He reports that he does not use drugs.  FH:  "Family history was reviewed, no pertinent findings to report    Objective   /72 (BP Location: Left arm, Patient Position: Sitting)   Pulse 64   Temp 36.7 °C (98 °F) (Temporal)   Ht 1.956 m (6' 5\")   Wt 88 kg (194 lb)   SpO2 99%   BMI 23.01 kg/m²     No acute distress  Alert and oriented ×3    BILATERAL Shoulder exam:  Range of motion INTACT  Strength testing NORMAL with empty can, internal rotation, external rotation and lift off testing  NO tenderness of the supraspinatus, infraspinatus or biceps tendon  Apprehension testing NORMAL    BILATERAL ELBOW exam  Range of motion intact  Mild swelling of the RIGHT olecranon bursa without tenderness  No tenderness of the medial or lateral epicondyle  Resisted finger extension test is NEGATIVE    1. Olecranon bursitis, right elbow          Insidious onset without specific incident that he can recall  Approximately 2 or more months ago (around early April 2024)  Swelling of the RIGHT olecranon bursa    Fortunately, bursitis has come down some  He still has some residual swelling    He will think about whether or not he wants to proceed with aspiration/corticosteroid injection of the olecranon bursa under ultrasound guidance    He can schedule if he would like to proceed with RIGHT elbow olecranon bursa corticosteroid injection under ultrasound guidance      Thank you Brsia Heredia P.A.-C.  For allowing me to participate in care of your patient.  "

## 2024-10-01 ENCOUNTER — APPOINTMENT (RX ONLY)
Dept: URBAN - NONMETROPOLITAN AREA CLINIC 15 | Facility: CLINIC | Age: 54
Setting detail: DERMATOLOGY
End: 2024-10-01

## 2024-10-01 DIAGNOSIS — Z71.89 OTHER SPECIFIED COUNSELING: ICD-10-CM

## 2024-10-01 DIAGNOSIS — D18.0 HEMANGIOMA: ICD-10-CM

## 2024-10-01 DIAGNOSIS — D22 MELANOCYTIC NEVI: ICD-10-CM

## 2024-10-01 DIAGNOSIS — L57.0 ACTINIC KERATOSIS: ICD-10-CM

## 2024-10-01 DIAGNOSIS — L82.1 OTHER SEBORRHEIC KERATOSIS: ICD-10-CM

## 2024-10-01 DIAGNOSIS — L81.4 OTHER MELANIN HYPERPIGMENTATION: ICD-10-CM

## 2024-10-01 PROBLEM — D22.5 MELANOCYTIC NEVI OF TRUNK: Status: ACTIVE | Noted: 2024-10-01

## 2024-10-01 PROBLEM — D22.61 MELANOCYTIC NEVI OF RIGHT UPPER LIMB, INCLUDING SHOULDER: Status: ACTIVE | Noted: 2024-10-01

## 2024-10-01 PROBLEM — D22.71 MELANOCYTIC NEVI OF RIGHT LOWER LIMB, INCLUDING HIP: Status: ACTIVE | Noted: 2024-10-01

## 2024-10-01 PROBLEM — D22.72 MELANOCYTIC NEVI OF LEFT LOWER LIMB, INCLUDING HIP: Status: ACTIVE | Noted: 2024-10-01

## 2024-10-01 PROBLEM — D22.62 MELANOCYTIC NEVI OF LEFT UPPER LIMB, INCLUDING SHOULDER: Status: ACTIVE | Noted: 2024-10-01

## 2024-10-01 PROBLEM — D18.01 HEMANGIOMA OF SKIN AND SUBCUTANEOUS TISSUE: Status: ACTIVE | Noted: 2024-10-01

## 2024-10-01 PROCEDURE — 17003 DESTRUCT PREMALG LES 2-14: CPT

## 2024-10-01 PROCEDURE — 99203 OFFICE O/P NEW LOW 30 MIN: CPT | Mod: 25

## 2024-10-01 PROCEDURE — 17000 DESTRUCT PREMALG LESION: CPT

## 2024-10-01 PROCEDURE — ? LIQUID NITROGEN

## 2024-10-01 PROCEDURE — ? COUNSELING

## 2024-10-01 ASSESSMENT — LOCATION DETAILED DESCRIPTION DERM
LOCATION DETAILED: LEFT MEDIAL SUPERIOR CHEST
LOCATION DETAILED: RIGHT VENTRAL PROXIMAL FOREARM
LOCATION DETAILED: RIGHT ANTERIOR PROXIMAL THIGH
LOCATION DETAILED: RIGHT DISTAL POSTERIOR UPPER ARM
LOCATION DETAILED: RIGHT DISTAL POSTERIOR THIGH
LOCATION DETAILED: RIGHT INFERIOR UPPER BACK
LOCATION DETAILED: LEFT VENTRAL DISTAL FOREARM
LOCATION DETAILED: LEFT ANTERIOR DISTAL THIGH
LOCATION DETAILED: LEFT VENTRAL PROXIMAL FOREARM
LOCATION DETAILED: LEFT POSTERIOR SHOULDER
LOCATION DETAILED: RIGHT ANTERIOR DISTAL THIGH
LOCATION DETAILED: NASAL DORSUM
LOCATION DETAILED: RIGHT MEDIAL INFERIOR CHEST
LOCATION DETAILED: LEFT DISTAL CALF
LOCATION DETAILED: LEFT DISTAL POSTERIOR UPPER ARM
LOCATION DETAILED: RIGHT MEDIAL SUPERIOR CHEST
LOCATION DETAILED: RIGHT PROXIMAL DORSAL FOREARM
LOCATION DETAILED: RIGHT LATERAL SUPERIOR CHEST
LOCATION DETAILED: RIGHT ANTERIOR PROXIMAL UPPER ARM
LOCATION DETAILED: LEFT INFERIOR FOREHEAD
LOCATION DETAILED: LEFT DISTAL POSTERIOR THIGH
LOCATION DETAILED: RIGHT PROXIMAL CALF
LOCATION DETAILED: LEFT PROXIMAL DORSAL FOREARM
LOCATION DETAILED: LEFT PROXIMAL CALF
LOCATION DETAILED: RIGHT POSTERIOR SHOULDER
LOCATION DETAILED: RIGHT VENTRAL DISTAL FOREARM
LOCATION DETAILED: LEFT LATERAL SUPERIOR CHEST
LOCATION DETAILED: RIGHT INFERIOR LATERAL MALAR CHEEK
LOCATION DETAILED: LEFT CENTRAL TEMPLE
LOCATION DETAILED: LEFT SUPERIOR LATERAL NECK
LOCATION DETAILED: PERIUMBILICAL SKIN
LOCATION DETAILED: LEFT PROXIMAL POSTERIOR UPPER ARM
LOCATION DETAILED: LEFT SUPERIOR LATERAL UPPER BACK
LOCATION DETAILED: LEFT LATERAL EYEBROW
LOCATION DETAILED: LEFT SUPERIOR CENTRAL MALAR CHEEK

## 2024-10-01 ASSESSMENT — LOCATION SIMPLE DESCRIPTION DERM
LOCATION SIMPLE: RIGHT CHEEK
LOCATION SIMPLE: LEFT CHEEK
LOCATION SIMPLE: RIGHT FOREARM
LOCATION SIMPLE: RIGHT UPPER ARM
LOCATION SIMPLE: LEFT UPPER BACK
LOCATION SIMPLE: LEFT THIGH
LOCATION SIMPLE: RIGHT SHOULDER
LOCATION SIMPLE: LEFT TEMPLE
LOCATION SIMPLE: LEFT SHOULDER
LOCATION SIMPLE: LEFT FOREARM
LOCATION SIMPLE: RIGHT CALF
LOCATION SIMPLE: RIGHT THIGH
LOCATION SIMPLE: LEFT CALF
LOCATION SIMPLE: LEFT EYEBROW
LOCATION SIMPLE: NOSE
LOCATION SIMPLE: LEFT FOREHEAD
LOCATION SIMPLE: NECK
LOCATION SIMPLE: ABDOMEN
LOCATION SIMPLE: CHEST
LOCATION SIMPLE: LEFT UPPER ARM
LOCATION SIMPLE: RIGHT POSTERIOR THIGH
LOCATION SIMPLE: RIGHT UPPER BACK
LOCATION SIMPLE: LEFT POSTERIOR THIGH

## 2024-10-01 ASSESSMENT — LOCATION ZONE DERM
LOCATION ZONE: LEG
LOCATION ZONE: NOSE
LOCATION ZONE: FACE
LOCATION ZONE: ARM
LOCATION ZONE: TRUNK
LOCATION ZONE: NECK